# Patient Record
Sex: FEMALE | Race: WHITE | Employment: STUDENT | URBAN - METROPOLITAN AREA
[De-identification: names, ages, dates, MRNs, and addresses within clinical notes are randomized per-mention and may not be internally consistent; named-entity substitution may affect disease eponyms.]

---

## 2018-01-16 ENCOUNTER — APPOINTMENT (OUTPATIENT)
Dept: CT IMAGING | Age: 23
End: 2018-01-16
Attending: NURSE PRACTITIONER
Payer: COMMERCIAL

## 2018-01-16 ENCOUNTER — HOSPITAL ENCOUNTER (EMERGENCY)
Age: 23
Discharge: HOME | End: 2018-01-16
Attending: EMERGENCY MEDICINE
Payer: COMMERCIAL

## 2018-01-16 VITALS
BODY MASS INDEX: 22.79 KG/M2 | SYSTOLIC BLOOD PRESSURE: 127 MMHG | HEIGHT: 65 IN | RESPIRATION RATE: 16 BRPM | OXYGEN SATURATION: 100 % | HEART RATE: 90 BPM | DIASTOLIC BLOOD PRESSURE: 88 MMHG | TEMPERATURE: 98.4 F | WEIGHT: 136.8 LBS

## 2018-01-16 DIAGNOSIS — R51.9 SCALP PAIN: ICD-10-CM
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
## 2018-01-16 DIAGNOSIS — S06.0X0A CONCUSSION WITHOUT LOSS OF CONSCIOUSNESS, INITIAL ENCOUNTER: ICD-10-CM

## 2018-01-16 LAB — HCG UR QL: NEGATIVE

## 2018-01-16 PROCEDURE — 74011250637 HC RX REV CODE- 250/637: Performed by: NURSE PRACTITIONER

## 2018-01-16 PROCEDURE — 99283 EMERGENCY DEPT VISIT LOW MDM: CPT

## 2018-01-16 PROCEDURE — 72125 CT NECK SPINE W/O DYE: CPT

## 2018-01-16 PROCEDURE — 70450 CT HEAD/BRAIN W/O DYE: CPT

## 2018-01-16 PROCEDURE — 81025 URINE PREGNANCY TEST: CPT

## 2018-01-16 RX ORDER — CYCLOBENZAPRINE HCL 10 MG
5 TABLET ORAL
Status: COMPLETED | OUTPATIENT
Start: 2018-01-16 | End: 2018-01-16

## 2018-01-16 RX ORDER — NORETHINDRONE ACETATE AND ETHINYL ESTRADIOL 1.5-30(21)
1 KIT ORAL DAILY
COMMUNITY

## 2018-01-16 RX ORDER — CYCLOBENZAPRINE HCL 10 MG
5 TABLET ORAL
Qty: 4 TAB | Refills: 0 | Status: SHIPPED | OUTPATIENT
Start: 2018-01-16 | End: 2018-01-18

## 2018-01-16 RX ADMIN — CYCLOBENZAPRINE HYDROCHLORIDE 5 MG: 10 TABLET, FILM COATED ORAL at 13:59

## 2018-01-16 NOTE — Clinical Note
Thank you for allowing us to care for you today. Please follow-up with your Primary Care provider in the next 2-3 days if your symptoms do not improve. Plan for home:  
 
Careful with the type of socks you wear around your home. Flexeril up to 3  times a day for muscle spasm No driving while using that medication

## 2018-01-16 NOTE — DISCHARGE INSTRUCTIONS
Concussion: Care Instructions  Your Care Instructions    A concussion is a kind of injury to the brain. It happens when the head receives a hard blow. The impact can jar or shake the brain against the skull. This interrupts the brain's normal activities. Although you may have cuts or bruises on your head or face, you may have no other visible signs of a brain injury. In most cases, damage to the brain from a concussion can't be seen in tests such as a CT or MRI scan. For a few weeks, you may have low energy, dizziness, trouble sleeping, a headache, ringing in your ears, or nausea. You may also feel anxious, grumpy, or depressed. You may have problems with memory and concentration. These symptoms are common after a concussion. They should slowly improve over time. Sometimes this takes weeks or even months. Someone who lives with you should know how to care for you. Please share this and all information with a caregiver who will be available to help if needed. Follow-up care is a key part of your treatment and safety. Be sure to make and go to all appointments, and call your doctor if you are having problems. It's also a good idea to know your test results and keep a list of the medicines you take. How can you care for yourself at home? Pain control  · Put ice or a cold pack on the part of your head that hurts for 10 to 20 minutes at a time. Put a thin cloth between the ice and your skin. · Be safe with medicines. Read and follow all instructions on the label. ¨ If the doctor gave you a prescription medicine for pain, take it as prescribed. ¨ If you are not taking a prescription pain medicine, ask your doctor if you can take an over-the-counter medicine. Recovery  · Follow your doctor's instructions. He or she will tell you if you need someone to watch you closely for the next 24 hours or longer. · Rest is the best way to recover from a concussion.  You need to rest your body and your brain:  ¨ Get plenty of sleep at night. And take rest breaks during the day. ¨ Avoid activities that take a lot of physical or mental work. This includes housework, exercise, schoolwork, video games, text messaging, and using the computer. ¨ You may need to change your school or work schedule while you recover. ¨ Return to your normal activities slowly. Do not try to do too much at once. · Do not drink alcohol or use illegal drugs. Alcohol and illegal drugs can slow your recovery. And they can increase your risk of a second brain injury. · Avoid activities that could lead to another concussion. Follow your doctor's instructions for a gradual return to activity and sports. · Ask your doctor when it's okay for you to drive a car, ride a bike, or operate machinery. How should you return to activity? Your return to sports or activity should be gradual. It should only begin when all symptoms of a concussion are gone, both while at rest and during exercise or exertion. Doctors and concussion specialists suggest steps to follow for returning to sports after a concussion. Use these steps as a guide. You should slowly progress through the following levels of activity:  1. No activity. This means complete physical and mental rest.  2. Light aerobic activity. This can include walking, swimming, or other exercise at less than 70% of maximum heart rate. No resistance training is included in this step. 3. Sport-specific exercise. This includes running drills or skating drills (depending on the sport), but no head impact. 4. Noncontact training drills. This includes more complex training drills such as passing. The athlete may also begin light resistance training. 5. Full-contact practice. The athlete can participate in normal training. 6. Return to normal game play. This is the final step and allows the athlete to join in normal game play. Watch and keep track of your progress.  It should take at least 6 days for you to go from light activity to normal game play. Make sure that you can stay at each new level of activity for at least 24 hours without symptoms, or as long as your doctor says, before doing more. If one or more symptoms come back, return to a lower level of activity for at least 24 hours. Don't move on until all symptoms are gone. When should you call for help? Call 911 anytime you think you may need emergency care. For example, call if:  ? · You have a seizure. ? · You passed out (lost consciousness). ? · You are confused or can't stay awake. ?Call your doctor now or seek immediate medical care if:  ? · You have new or worse vomiting. ? · You feel less alert. ? · You have new weakness or numbness in any part of your body. ? Watch closely for changes in your health, and be sure to contact your doctor if:  ? · You do not get better as expected. ? · You have new symptoms, such as headaches, trouble concentrating, or changes in mood. Where can you learn more? Go to http://sita-jacklyn.info/. Enter E774 in the search box to learn more about \"Concussion: Care Instructions. \"  Current as of: October 14, 2016  Content Version: 11.4  © 8767-1211 [employer]. Care instructions adapted under license by [employer] (which disclaims liability or warranty for this information). If you have questions about a medical condition or this instruction, always ask your healthcare professional. [place] [address] any warranty or liability for your use of this information.

## 2018-01-16 NOTE — ED TRIAGE NOTES
Pt presents with c-collar in. States she slipped while wearing socks last night on a metal staircase. States she hit her head but did not have LOC. Sent from [facility] clinic, they put the c-collar on her. Complains of posterior head, upper back/shoulder and into neck pain.

## 2018-01-16 NOTE — LETTER
NOTIFICATION RETURN TO WORK / SCHOOL 
 
1/16/2018 4:05 PM 
 
Ms. Pratik Azar [address] [id number] To Whom It May Concern: 
 
Pratik Azar is currently under the care of [place] [address], [facility]. She will return to work on: January 22, 2018 and school as tolerated with assistance as needed until her concussion symptoms resolve. If there are questions or concerns please have the patient contact our office. Sincerely, Gloria Camarena NP 
01/16/18 
4:06 PM

## 2018-01-16 NOTE — ED PROVIDER NOTES
HPI Comments: The patient is a 26 y.o. female with no significant past medical history who presents from [address] [place] [address] with chief complaint of concussion and neck pain. Patient states that yesterday evening she was wearing socks when she slipped and fell backwards on the metal steps in her apartment. She denies LOC. She went to work this morning. Her boss sent her to the physician after repeatedly asking \"where the water fountain is\". She went to the [address] [place] [address] clinic for neck pain and possible concussion. The [facility] center sent her to [facility] (gave her the address). They also placed her in an extrication collar. She had a friend bring her to the ED. She is asking for food. She does has some nausea that she relates to not eating. Patient denies: fevers, chills, vomiting, diarrhea, constipation, loss of bowel or bladder, chest pain, shortness of breath or dyspnea on exertion. Nothing makes the symptoms better or worse. There are no other acute medical concerns at this time. PCP: No primary care provider on file. The history is provided by the patient. No  was used. Past Medical History:   Diagnosis Date    UTI (urinary tract infection)        History reviewed. No pertinent surgical history. History reviewed. No pertinent family history. Social History     Social History    Marital status: N/A     Spouse name: N/A    Number of children: N/A    Years of education: N/A     Occupational History    Not on file. Social History Main Topics    Smoking status: Never Smoker    Smokeless tobacco: Never Used    Alcohol use Yes    Drug use: No    Sexual activity: Not on file     Other Topics Concern    Not on file     Social History Narrative    No narrative on file         ALLERGIES: Review of patient's allergies indicates no known allergies. Review of Systems   Constitutional: Negative for appetite change, chills and fever.    HENT: Negative. Respiratory: Negative for cough, shortness of breath and wheezing. Cardiovascular: Negative for chest pain. Gastrointestinal: Negative for abdominal pain, constipation, diarrhea, nausea and vomiting. Genitourinary: Negative for dysuria and urgency. Musculoskeletal: Positive for neck pain. Negative for back pain, gait problem and neck stiffness. Skin: Negative for color change and rash. Neurological: Positive for dizziness and headaches (mild). Negative for tremors, seizures, syncope, facial asymmetry, speech difficulty, weakness and light-headedness. Asking repeated questions     Psychiatric/Behavioral: Negative. All other systems reviewed and are negative. Vitals:    01/16/18 1309   BP: 127/88   Pulse: 90   Resp: 16   Temp: 98.4 °F (36.9 °C)   SpO2: 100%   Weight: 62.1 kg (136 lb 12.8 oz)   Height: 5' 5\" (1.651 m)            Physical Exam   Constitutional: She is oriented to person, place, and time. Vital signs are normal. She appears well-developed and well-nourished. Non-toxic appearance. She does not have a sickly appearance. She does not appear ill. No distress. HENT:   Head: Normocephalic and atraumatic. Neck: Normal range of motion. Neck supple. Cardiovascular: Normal rate, regular rhythm, normal heart sounds and intact distal pulses. Pulses:       Radial pulses are 2+ on the right side, and 2+ on the left side. Pulmonary/Chest: Effort normal and breath sounds normal. No respiratory distress. She has no wheezes. She has no rales. She exhibits no tenderness. Abdominal: Soft. Bowel sounds are normal. There is no tenderness. There is no guarding. Musculoskeletal: She exhibits tenderness. She exhibits no deformity. Cervical back: She exhibits decreased range of motion, tenderness, bony tenderness, pain and spasm. She exhibits no swelling, no edema, no deformity, no laceration and normal pulse.    In c-collar on initial exam. Tenderness to base of skull and c-spine. No point tenderness       Neurological: She is alert and oriented to person, place, and time. She has normal strength. No cranial nerve deficit or sensory deficit. She displays a negative Romberg sign. GCS eye subscore is 4. GCS verbal subscore is 5. GCS motor subscore is 6. Skin: Skin is warm and dry. No erythema. Psychiatric: She has a normal mood and affect. Her behavior is normal. Judgment and thought content normal.   Nursing note and vitals reviewed. [facility]  ED Course     Assessment & Plan:   UPT  CT head  CT c-spine    Discussed with MD Rhett Rashid, AYLIN  01/16/18  1:33 PM    4:04 PM    Extrication collar cleared. No point tenderness but as base of skull. CT scans negative for acute abnormality. Discussed available lab and imaging results with patient. She verbalizes understanding and agrees with care plan. Will discharge patient home with specific return precautions; prescription Flexeril; f/u with PCP.    4:09 PM  The patient has been reevaluated. The patient is ready for discharge. The patient's signs, symptoms, diagnosis, and discharge instructions have been discussed and the patient/ family has conveyed their understanding. The patient is to follow up as recommended or return to the ED should their symptoms worsen. Plan has been discussed and the patient is in agreement. LABORATORY TESTS:  Recent Results (from the past 12 hour(s))   HCG URINE, QL. - POC    Collection Time: 01/16/18  1:40 PM   Result Value Ref Range    Pregnancy test,urine (POC) NEGATIVE  NEG         IMAGING RESULTS:  CT SPINE CERV WO CONT   Final Result      CT HEAD WO CONT   Final Result        Ct Head Wo Cont    Result Date: 1/16/2018  INDICATION: Head trauma, closed, mild, GCS >= 13, no risk factors, neuro exam normal. Slipped on a metal staircase last night. Exam: Noncontrast CT of the brain is performed with 5 mm collimation.  CT dose reduction was achieved with the use of the standardized protocol tailored for this examination and automatic exposure control for dose modulation. FINDINGS: There is no acute intracranial hemorrhage, mass, mass effect or herniation. Ventricular system is normal. The gray-white matter differentiation is well-preserved. The mastoid air cells are well pneumatized. The visualized paranasal sinuses are normal.     IMPRESSION: No acute intracranial hemorrhage, mass or infarct. Ct Spine Cerv Wo Cont    Result Date: 1/16/2018  EXAM:  CT CERVICAL SPINE WITHOUT CONTRAST INDICATION:   Recent trauma, spine. Slipped and fell backwards on metal steps in her apartment yesterday evening. Presented to the student [facility] health clinic secondary to signs of confusion at work and concern for concussion. Also has decreased range of motion and tenderness to palpation in the cervical spine on physical exam. COMPARISON: None. CONTRAST:  None. TECHNIQUE: Multislice helical CT of the cervical spine was performed without intravenous contrast administration. Sagittal and coronal reconstructions were generated. CT dose reduction was achieved through use of a standardized protocol tailored for this examination and automatic exposure control for dose modulation. FINDINGS: The alignment is within normal limits. There is no fracture or subluxation. The odontoid process is intact. The craniocervical junction is within normal limits. The prevertebral soft tissues are within normal limits. C2-C3:  There is no spinal canal or neural foraminal stenosis. C3-C4:  There is no spinal canal or neural foraminal stenosis. C4-C5:  There is no spinal canal or neural foraminal stenosis. C5-C6:  There is no spinal canal or neural foraminal stenosis. C6-C7:  There is no spinal canal or neural foraminal stenosis. C7-T1:  There is no spinal canal or neural foraminal stenosis. IMPRESSION: No bony abnormality seen in the cervical spine.          MEDICATIONS GIVEN:  Medications   cyclobenzaprine (FLEXERIL) tablet 5 mg (5 mg Oral Given 1/16/18 5162)       IMPRESSION:  1. Fall, initial encounter    2. Scalp pain    3. Concussion without loss of consciousness, initial encounter        PLAN:  1. Current Discharge Medication List      START taking these medications    Details   cyclobenzaprine (FLEXERIL) 10 mg tablet Take 0.5 Tabs by mouth three (3) times daily as needed for Muscle Spasm(s) for up to 2 days. No driving while taking this medication  Indications: Muscle Spasm  Qty: 4 Tab, Refills: 0           2. Follow-up Information     Follow up With Details Comments Contact Info    Estrada Other, MD   Patient can only remember the practice name and not the physician      [address]  As needed [address] [place]  [phone]    [address] [facility] Schedule an appointment as soon as possible for a visit As needed if your symptoms persist [address]  [address] [facility] [address] [place] [address] EMERGENCY DEP  As needed, If symptoms worsen [address] [facility]  [phone]        3.  Discussed return precautions and need for concussion follow-up    Return to ED for new or worsening symptoms       Elmira Wright NP      Procedures